# Patient Record
Sex: MALE | Race: WHITE
[De-identification: names, ages, dates, MRNs, and addresses within clinical notes are randomized per-mention and may not be internally consistent; named-entity substitution may affect disease eponyms.]

---

## 2021-10-06 ENCOUNTER — HOSPITAL ENCOUNTER (EMERGENCY)
Dept: HOSPITAL 95 - ER | Age: 66
Discharge: LEFT BEFORE DISCHARGE | End: 2021-10-06
Payer: OTHER GOVERNMENT

## 2021-10-06 VITALS — HEIGHT: 69 IN | WEIGHT: 170 LBS | BODY MASS INDEX: 25.18 KG/M2

## 2021-10-06 DIAGNOSIS — Z53.21: ICD-10-CM

## 2021-10-06 DIAGNOSIS — R07.89: Primary | ICD-10-CM

## 2021-10-06 LAB
ALBUMIN SERPL BCP-MCNC: 3.7 G/DL (ref 3.4–5)
ALBUMIN/GLOB SERPL: 0.8 {RATIO} (ref 0.8–1.8)
ALT SERPL W P-5'-P-CCNC: 54 U/L (ref 12–78)
ANION GAP SERPL CALCULATED.4IONS-SCNC: 7 MMOL/L (ref 6–16)
AST SERPL W P-5'-P-CCNC: 35 U/L (ref 12–37)
BILIRUB SERPL-MCNC: 0.7 MG/DL (ref 0.1–1)
BUN SERPL-MCNC: 10 MG/DL (ref 8–24)
CALCIUM SERPL-MCNC: 9.1 MG/DL (ref 8.5–10.1)
CHLORIDE SERPL-SCNC: 109 MMOL/L (ref 98–108)
CO2 SERPL-SCNC: 26 MMOL/L (ref 21–32)
CREAT SERPL-MCNC: 0.96 MG/DL (ref 0.6–1.2)
GLOBULIN SER CALC-MCNC: 4.9 G/DL (ref 2.2–4)
GLUCOSE SERPL-MCNC: 106 MG/DL (ref 70–99)
POTASSIUM SERPL-SCNC: 3.2 MMOL/L (ref 3.5–5.5)
PROT SERPL-MCNC: 8.6 G/DL (ref 6.4–8.2)
SODIUM SERPL-SCNC: 142 MMOL/L (ref 136–145)
TROPONIN I SERPL-MCNC: <0.015 NG/ML (ref 0–0.04)

## 2021-10-15 ENCOUNTER — HOSPITAL ENCOUNTER (EMERGENCY)
Dept: HOSPITAL 95 - ER | Age: 66
Discharge: HOME | End: 2021-10-15
Payer: OTHER GOVERNMENT

## 2021-10-15 VITALS — BODY MASS INDEX: 27.32 KG/M2 | HEIGHT: 66 IN | WEIGHT: 170 LBS

## 2021-10-15 DIAGNOSIS — R10.9: Primary | ICD-10-CM

## 2021-10-15 DIAGNOSIS — I10: ICD-10-CM

## 2021-10-15 DIAGNOSIS — Z87.891: ICD-10-CM

## 2021-10-15 DIAGNOSIS — Z79.899: ICD-10-CM

## 2021-10-15 LAB
ALBUMIN SERPL BCP-MCNC: 3.9 G/DL (ref 3.4–5)
ALBUMIN/GLOB SERPL: 0.9 {RATIO} (ref 0.8–1.8)
ALT SERPL W P-5'-P-CCNC: 44 U/L (ref 12–78)
ANION GAP SERPL CALCULATED.4IONS-SCNC: 8 MMOL/L (ref 6–16)
AST SERPL W P-5'-P-CCNC: 30 U/L (ref 12–37)
BASOPHILS # BLD AUTO: 0.1 K/MM3 (ref 0–0.23)
BASOPHILS NFR BLD AUTO: 1 % (ref 0–2)
BILIRUB SERPL-MCNC: 1 MG/DL (ref 0.1–1)
BUN SERPL-MCNC: 15 MG/DL (ref 8–24)
CALCIUM SERPL-MCNC: 9.5 MG/DL (ref 8.5–10.1)
CHLORIDE SERPL-SCNC: 105 MMOL/L (ref 98–108)
CO2 SERPL-SCNC: 26 MMOL/L (ref 21–32)
CREAT SERPL-MCNC: 1.06 MG/DL (ref 0.6–1.2)
DEPRECATED RDW RBC AUTO: 48.8 FL (ref 35.1–46.3)
EOSINOPHIL # BLD AUTO: 0.24 K/MM3 (ref 0–0.68)
EOSINOPHIL NFR BLD AUTO: 2 % (ref 0–6)
ERYTHROCYTE [DISTWIDTH] IN BLOOD BY AUTOMATED COUNT: 14.2 % (ref 11.7–14.2)
GLOBULIN SER CALC-MCNC: 4.4 G/DL (ref 2.2–4)
GLUCOSE SERPL-MCNC: 106 MG/DL (ref 70–99)
HCT VFR BLD AUTO: 40.7 % (ref 37–53)
HGB BLD-MCNC: 13.8 G/DL (ref 13.5–17.5)
IMM GRANULOCYTES # BLD AUTO: 0.04 K/MM3 (ref 0–0.1)
IMM GRANULOCYTES NFR BLD AUTO: 0 % (ref 0–1)
KETONES UR STRIP-MCNC: (no result) MG/DL
LYMPHOCYTES # BLD AUTO: 2.58 K/MM3 (ref 0.84–5.2)
LYMPHOCYTES NFR BLD AUTO: 20 % (ref 21–46)
MCHC RBC AUTO-ENTMCNC: 33.9 G/DL (ref 31.5–36.5)
MCV RBC AUTO: 93 FL (ref 80–100)
MONOCYTES # BLD AUTO: 1.25 K/MM3 (ref 0.16–1.47)
MONOCYTES NFR BLD AUTO: 10 % (ref 4–13)
NEUTROPHILS # BLD AUTO: 8.82 K/MM3 (ref 1.96–9.15)
NEUTROPHILS NFR BLD AUTO: 68 % (ref 41–73)
NRBC # BLD AUTO: 0 K/MM3 (ref 0–0.02)
NRBC BLD AUTO-RTO: 0 /100 WBC (ref 0–0.2)
PLATELET # BLD AUTO: 259 K/MM3 (ref 150–400)
POTASSIUM SERPL-SCNC: 3.4 MMOL/L (ref 3.5–5.5)
PROT SERPL-MCNC: 8.3 G/DL (ref 6.4–8.2)
PROT UR STRIP-MCNC: (no result) MG/DL
RBC #/AREA URNS HPF: (no result) /HPF (ref 0–2)
SODIUM SERPL-SCNC: 139 MMOL/L (ref 136–145)
SP GR SPEC: 1.02 (ref 1–1.02)
UROBILINOGEN UR STRIP-MCNC: (no result) MG/DL
WBC #/AREA URNS HPF: (no result) /HPF (ref 0–5)

## 2021-10-17 ENCOUNTER — HOSPITAL ENCOUNTER (INPATIENT)
Dept: HOSPITAL 95 - ER | Age: 66
LOS: 5 days | Discharge: PSYCHIATRIC OR SUBSTANCE USE FACILITY | DRG: 918 | End: 2021-10-22
Attending: HOSPITALIST | Admitting: HOSPITALIST
Payer: OTHER GOVERNMENT

## 2021-10-17 VITALS — WEIGHT: 159.99 LBS | HEIGHT: 69 IN | BODY MASS INDEX: 23.7 KG/M2

## 2021-10-17 DIAGNOSIS — R51.9: ICD-10-CM

## 2021-10-17 DIAGNOSIS — F31.9: ICD-10-CM

## 2021-10-17 DIAGNOSIS — E87.6: ICD-10-CM

## 2021-10-17 DIAGNOSIS — Z87.891: ICD-10-CM

## 2021-10-17 DIAGNOSIS — I95.9: ICD-10-CM

## 2021-10-17 DIAGNOSIS — F19.10: ICD-10-CM

## 2021-10-17 DIAGNOSIS — I10: ICD-10-CM

## 2021-10-17 DIAGNOSIS — T50.912A: Primary | ICD-10-CM

## 2021-10-17 DIAGNOSIS — K59.00: ICD-10-CM

## 2021-10-17 DIAGNOSIS — E83.39: ICD-10-CM

## 2021-10-17 DIAGNOSIS — Z98.890: ICD-10-CM

## 2021-10-17 DIAGNOSIS — F15.10: ICD-10-CM

## 2021-10-17 DIAGNOSIS — Z79.899: ICD-10-CM

## 2021-10-17 DIAGNOSIS — F12.10: ICD-10-CM

## 2021-10-17 DIAGNOSIS — Z90.49: ICD-10-CM

## 2021-10-17 DIAGNOSIS — F41.9: ICD-10-CM

## 2021-10-17 DIAGNOSIS — Z20.822: ICD-10-CM

## 2021-10-17 DIAGNOSIS — F10.10: ICD-10-CM

## 2021-10-17 DIAGNOSIS — D64.9: ICD-10-CM

## 2021-10-17 LAB
ALBUMIN SERPL BCP-MCNC: 3.5 G/DL (ref 3.4–5)
ALBUMIN/GLOB SERPL: 0.8 {RATIO} (ref 0.8–1.8)
ALT SERPL W P-5'-P-CCNC: 38 U/L (ref 12–78)
ANION GAP SERPL CALCULATED.4IONS-SCNC: 5 MMOL/L (ref 6–16)
APAP SERPL-MCNC: <2 UG/ML (ref 10–30)
AST SERPL W P-5'-P-CCNC: 33 U/L (ref 12–37)
BASOPHILS # BLD AUTO: 0.06 K/MM3 (ref 0–0.23)
BASOPHILS NFR BLD AUTO: 1 % (ref 0–2)
BILIRUB SERPL-MCNC: 0.9 MG/DL (ref 0.1–1)
BUN SERPL-MCNC: 8 MG/DL (ref 8–24)
CALCIUM SERPL-MCNC: 9 MG/DL (ref 8.5–10.1)
CHLORIDE SERPL-SCNC: 108 MMOL/L (ref 98–108)
CO2 SERPL-SCNC: 27 MMOL/L (ref 21–32)
CREAT SERPL-MCNC: 0.96 MG/DL (ref 0.6–1.2)
DEPRECATED RDW RBC AUTO: 49 FL (ref 35.1–46.3)
EOSINOPHIL # BLD AUTO: 0.19 K/MM3 (ref 0–0.68)
EOSINOPHIL NFR BLD AUTO: 2 % (ref 0–6)
ERYTHROCYTE [DISTWIDTH] IN BLOOD BY AUTOMATED COUNT: 14.2 % (ref 11.7–14.2)
GLOBULIN SER CALC-MCNC: 4.2 G/DL (ref 2.2–4)
GLUCOSE SERPL-MCNC: 151 MG/DL (ref 70–99)
HCT VFR BLD AUTO: 40.1 % (ref 37–53)
HGB BLD-MCNC: 13.5 G/DL (ref 13.5–17.5)
IMM GRANULOCYTES # BLD AUTO: 0.02 K/MM3 (ref 0–0.1)
IMM GRANULOCYTES NFR BLD AUTO: 0 % (ref 0–1)
LYMPHOCYTES # BLD AUTO: 1.81 K/MM3 (ref 0.84–5.2)
LYMPHOCYTES NFR BLD AUTO: 17 % (ref 21–46)
MCHC RBC AUTO-ENTMCNC: 33.7 G/DL (ref 31.5–36.5)
MCV RBC AUTO: 94 FL (ref 80–100)
MONOCYTES # BLD AUTO: 0.96 K/MM3 (ref 0.16–1.47)
MONOCYTES NFR BLD AUTO: 9 % (ref 4–13)
NEUTROPHILS # BLD AUTO: 7.63 K/MM3 (ref 1.96–9.15)
NEUTROPHILS NFR BLD AUTO: 71 % (ref 41–73)
NRBC # BLD AUTO: 0 K/MM3 (ref 0–0.02)
NRBC BLD AUTO-RTO: 0 /100 WBC (ref 0–0.2)
PLATELET # BLD AUTO: 257 K/MM3 (ref 150–400)
POTASSIUM SERPL-SCNC: 3 MMOL/L (ref 3.5–5.5)
PROT SERPL-MCNC: 7.7 G/DL (ref 6.4–8.2)
SALICYLATES SERPL-MCNC: <1.7 MG/DL (ref 2.8–20)
SARS-COV-2 RNA RESP QL NAA+PROBE: NEGATIVE
SODIUM SERPL-SCNC: 140 MMOL/L (ref 136–145)

## 2021-10-17 PROCEDURE — A9270 NON-COVERED ITEM OR SERVICE: HCPCS

## 2021-10-17 PROCEDURE — U0004 COV-19 TEST NON-CDC HGH THRU: HCPCS

## 2021-10-17 PROCEDURE — G0480 DRUG TEST DEF 1-7 CLASSES: HCPCS

## 2021-10-17 NOTE — NUR
PT C/O HEADACHE. 7 OUT OF 10 ON A 0-10 SCALE. PAIN UNRELIEVED WITH TYLENOL.
DR. LAURENT LEON, NEW ORDER RECIEVED FOR IBUPROFEN.

## 2021-10-17 NOTE — NUR
PT UP TO ICUW 16 VIA STRETCHER FROM ED. ACCOMPANIED BY ED RN. PT TX TO UNIT
BED. BED BATH PERFORMED AT THIS TIME. PT PLACED IN BRIEF D/T INCONTINENCE.
PLACED ON ALL BEDSIDE MONITORS. PT DROWSY, ORIENTED TO PERSON, PLACE,
SITUATION. BED IN LOWEST POSITION. BED ALARM ACTIVE. SEE [employer] FOR DETAILED
ASSESSMENT.

## 2021-10-18 LAB
ALBUMIN SERPL BCP-MCNC: 3 G/DL (ref 3.4–5)
ALBUMIN/GLOB SERPL: 0.8 {RATIO} (ref 0.8–1.8)
ALT SERPL W P-5'-P-CCNC: 31 U/L (ref 12–78)
ANION GAP SERPL CALCULATED.4IONS-SCNC: 6 MMOL/L (ref 6–16)
AST SERPL W P-5'-P-CCNC: 27 U/L (ref 12–37)
BILIRUB SERPL-MCNC: 0.6 MG/DL (ref 0.1–1)
BUN SERPL-MCNC: 8 MG/DL (ref 8–24)
CALCIUM SERPL-MCNC: 8.6 MG/DL (ref 8.5–10.1)
CHLORIDE SERPL-SCNC: 112 MMOL/L (ref 98–108)
CO2 SERPL-SCNC: 25 MMOL/L (ref 21–32)
CREAT SERPL-MCNC: 0.98 MG/DL (ref 0.6–1.2)
DEPRECATED RDW RBC AUTO: 49.7 FL (ref 35.1–46.3)
ERYTHROCYTE [DISTWIDTH] IN BLOOD BY AUTOMATED COUNT: 14.4 % (ref 11.7–14.2)
GLOBULIN SER CALC-MCNC: 3.8 G/DL (ref 2.2–4)
GLUCOSE SERPL-MCNC: 107 MG/DL (ref 70–99)
HCT VFR BLD AUTO: 36.8 % (ref 37–53)
HGB BLD-MCNC: 12.5 G/DL (ref 13.5–17.5)
MCHC RBC AUTO-ENTMCNC: 34 G/DL (ref 31.5–36.5)
MCV RBC AUTO: 94 FL (ref 80–100)
NRBC # BLD AUTO: 0 K/MM3 (ref 0–0.02)
NRBC BLD AUTO-RTO: 0 /100 WBC (ref 0–0.2)
PLATELET # BLD AUTO: 255 K/MM3 (ref 150–400)
POTASSIUM SERPL-SCNC: 3 MMOL/L (ref 3.5–5.5)
PROT SERPL-MCNC: 6.8 G/DL (ref 6.4–8.2)
SODIUM SERPL-SCNC: 143 MMOL/L (ref 136–145)

## 2021-10-18 NOTE — NUR
ASSUMED PT CARE
 
REPORT FROM YAO VILLEGAS AT 0700, ASSUMED PT CARE.
PT ALERT AND ORIENTED X2. CONFUSED TO TIME AND SITUATION. ANSWERS QUESTIONS
APPROPRIATELY, SLOWLY. PT STUTTERS SOME. PT C/O CONSTANTLY ABOUT HIS HEAD
HURTING DESPITE MEDICATIONS AND NONPHARMACOLOGICAL EFFORTS. PT LUNG SOUNDS
DIMINISHED, SATS >92% ON RA. SINUS RHYTHM ON MONITOR. BP STABLE. PT DENIES
N/V. OLD SCARRING TO ABD FROM SOME SURGERY DONE IN [place] PER PT. BOWEL SOUNDS
ACTIVE. VOIDING PER URINAL, CLEAR YELLOW. STARTING ON BOWEL CARE TODAY.
DENIES BEING SUICIDAL.
CALL LIGHT IN REACH. SEE FULL SHIFT ASSESSMENT.

## 2021-10-18 NOTE — NUR
Spiritual care visit attempted. Upon receiving a referral for spiritual care,
I visit patient. Patient is very groggy and asks if I could back another time.
I will continue to remain available to patient and family.

## 2021-10-18 NOTE — NUR
SHIFT SUMMARY
 
PT REMAINS ON 2MD HOLD AND CONTINUOUS VIDEO MONITORING. PT ANXIOUS MOST OF THE
TIME AND CONSTANTLY COMPLAINS ABOUT HEADACHE (WHILE AWAKE). PT ALERT TO PLACE
AND  PERSON. TALKS ABOUT HOW HE DOESNT WANT TO GO BACK TO HIS APARTMENT, PT
CALLS IT correction. PT C/O ABOUT HE CANT TAKE CARE OF HIMSELF AND THAT HE WANTS
TO BE DEAD. STATES HE WONT ATTEMPT WHILE IN HOSPITAL BUT MIGHT IF HE WAS SENT
HOME. NO ACTIVE PLAN. PT HAS GENERALIZED WEAKNESS. ABLE TO FEED HIMSELF, USE
URINAL AND GET DRINKS FROM CUPS. USES CALL LIGHT SOMETIMES. TYPICALLY YELLS
FOR "NURSE". PT ANSWERS QUESTIONS AND ABLE TO COMMUNICATE MOST NEEDS. UP IN
CHAIR FOR MOST OF SHIFT. SEEMS MORE COMFORTABLE. LUNG SOUNDS CLEAR/DIMINISHED.
SATS >92% ON RA. SBP WNL. HR SR ON MONITOR.
WILL REPORT TO ONCOMING SHIFT.

## 2021-10-19 LAB
ALBUMIN SERPL BCP-MCNC: 3 G/DL (ref 3.4–5)
ANION GAP SERPL CALCULATED.4IONS-SCNC: 6 MMOL/L (ref 6–16)
BASOPHILS # BLD AUTO: 0.07 K/MM3 (ref 0–0.23)
BASOPHILS NFR BLD AUTO: 1 % (ref 0–2)
BUN SERPL-MCNC: 8 MG/DL (ref 8–24)
CALCIUM SERPL-MCNC: 8.7 MG/DL (ref 8.5–10.1)
CHLORIDE SERPL-SCNC: 112 MMOL/L (ref 98–108)
CO2 SERPL-SCNC: 22 MMOL/L (ref 21–32)
CREAT SERPL-MCNC: 0.97 MG/DL (ref 0.6–1.2)
DEPRECATED RDW RBC AUTO: 51.6 FL (ref 35.1–46.3)
EOSINOPHIL # BLD AUTO: 0.37 K/MM3 (ref 0–0.68)
EOSINOPHIL NFR BLD AUTO: 4 % (ref 0–6)
ERYTHROCYTE [DISTWIDTH] IN BLOOD BY AUTOMATED COUNT: 14.8 % (ref 11.7–14.2)
GLUCOSE SERPL-MCNC: 88 MG/DL (ref 70–99)
HCT VFR BLD AUTO: 39 % (ref 37–53)
HGB BLD-MCNC: 13.1 G/DL (ref 13.5–17.5)
IMM GRANULOCYTES # BLD AUTO: 0.03 K/MM3 (ref 0–0.1)
IMM GRANULOCYTES NFR BLD AUTO: 0 % (ref 0–1)
LYMPHOCYTES # BLD AUTO: 2.87 K/MM3 (ref 0.84–5.2)
LYMPHOCYTES NFR BLD AUTO: 27 % (ref 21–46)
MCHC RBC AUTO-ENTMCNC: 33.6 G/DL (ref 31.5–36.5)
MCV RBC AUTO: 95 FL (ref 80–100)
MONOCYTES # BLD AUTO: 0.93 K/MM3 (ref 0.16–1.47)
MONOCYTES NFR BLD AUTO: 9 % (ref 4–13)
NEUTROPHILS # BLD AUTO: 6.28 K/MM3 (ref 1.96–9.15)
NEUTROPHILS NFR BLD AUTO: 60 % (ref 41–73)
NRBC # BLD AUTO: 0 K/MM3 (ref 0–0.02)
NRBC BLD AUTO-RTO: 0 /100 WBC (ref 0–0.2)
PHOSPHATE SERPL-MCNC: 2.3 MG/DL (ref 2.5–4.9)
PLATELET # BLD AUTO: 238 K/MM3 (ref 150–400)
POTASSIUM SERPL-SCNC: 3.2 MMOL/L (ref 3.5–5.5)
SODIUM SERPL-SCNC: 140 MMOL/L (ref 136–145)

## 2021-10-19 NOTE — NUR
PT STATES HE IS A BALL OF NERVES. STATES HE WANTS TO DIE BUT HAS NO PLAN AS TO
HOW TO DO THIS. ATTEMPTS TO SOOTHE AND PROVIDE COMFORT BUT PT'S THINKING IS
VERY CONCRETE. CONTINUES TO STATE HE IS ANXIOUS. WILL PROVIDE WITH SCHEDULED
MEDS WHEN AVAILABLE.

## 2021-10-19 NOTE — NUR
ASSUMED CARE:
 
PT RESTING QUIETLY AT THIS TIME. LOW SI RISK PER ORDERS. SINUS TACH ON TELE AT
101. NO ACUTE NEEDS OR CONCERNS AT THIS TIME.

## 2021-10-19 NOTE — NUR
PT CALLS FOR STAFF IN ROOM AND STATES HE IS A BUNDLE OF NERVES. STATES HE
THINKS HE'S HAVING A PANIC ATTACK. ASKED HIM IF HE WAS HAVING THOUGHTS OF
HARMINIG HIMSELF AND STATES HE WISHES HE COULD BUT HAS NO WAY TO DO IT AND
DOESN'T HAVE A PLAN TO DO IT. PHYSICAL THERAPIST ON WAY TO ROOM AT THIS TIME

## 2021-10-19 NOTE — NUR
SHIFT SUMMARY:
 
PT HAS REMAINED LOW S.I. RISK THIS SHIFT. CONTINUES TO SAY HE WANTS TO DIE BUT
DOES NOT HAVE A PLAN IN PLACE. DR CHANG CAME TO SEE PT AND PLAN IS FOR DC
TO FACILITY PER DC PLANNING. PT IS ANXIOUS AT TIMES BUT NO ACUTE CHANGES THIS
SHIFT

## 2021-10-19 NOTE — NUR
A  FROM [facility] (PAMELA) AND A WORKER FROM [facility] (CARMEN), CALLED TO
GET UPDATES ON PT. NUMBERS RELAYED TO NELLY MENDOZA CASE MANAGER, IN ORDER TO
COORDINATE CARE AND ANSWER QUESTIONS

## 2021-10-19 NOTE — NUR
Request to complete Suicide Safety Plan is deferred at this time.  Order for
transfer to higher level of care for psychiatric inpatient hospitalization
in medical record from psychiatrist.
Ju Juarez M.Ed., [facility]

## 2021-10-20 NOTE — NUR
SHIFT SUMMARY
PT ADMITTED ON 10/17 FOR SI. TOOK A HANDFUL OF PILLS. A&OX4 WITH INTERMITTENT
CONFUSION. PT IS ON RA. PT IS ON 2 MD HOLD. PT IS A 1 PERSON ASSIST. ADLS
PROVIDED, SAFETY MEASURES IN PLACE. WILL CONTINUE TO MONITOR.

## 2021-10-21 LAB — SARS-COV-2 RNA RESP QL NAA+PROBE: NEGATIVE

## 2021-10-21 NOTE — NUR
pt has been quite pleasant today. no c/o pain. we were planning d/c [facility] to
[facility] today between noon and 3. this changed to tomorrow. expecting to
transport out at 8am tomorrow. nite rn to call report and set up
transportation shortly after [facility]. no new concerns today.. bed in
low position, call lite in reach, bed alarm on for safety

## 2021-10-21 NOTE — NUR
SHIFT SUMMARY
PT IS A&OX3. ABLE TO MAKE NEEDS KNOWN. FREQUENTLY YELLS OUT FOR NURSE. NO
ACUTE CHANGES ON THIS SHIFT. PT SLEPT MOST OF THE NIGHT. ADLS PROVIDED, SAFETY
MEASURES IN PLACE. SI PRECAUTIONS DISCONTINUED. WILL CONTINUE TO MONITOR.

## 2021-10-22 LAB — CANNABINOIDS UR QL: DETECTED

## 2021-10-22 NOTE — NUR
REPORT CALLED TO ADOLFO VILLEGAS IN [place] IN [facility]. SHE REQUESTED WE REMOVE
IV. DONE. TRANSPORT HERE AT 0830. TOOK PT. PT HAS PERSONAL PRESCRIPTIONS IN
BAG. ITEMIZED AND VERIFIED BY SELF AN ASHLI VILLEGAS. SEALED IN BAG. SIGNED RECEIPT
BY TRANSPORT

## 2021-10-22 NOTE — NUR
PT AWAKE ORIENTED X3 PLEASANT. H/R REG, NO MURMER NOTED. PER TELE NSR AT 80'S.
LUNGS CLEAR, RESP EASY, UNLABORED ON R.A. BT X4 LAST BM 2 DAYS . VOIDS URINAL
AND OCC INCONT. IN ATTENDS. 1 ASST BSC. BED IN LOW POSITION, CALL LITE IN
REACH, BED ALARM ON FOR SAFETY. PT NOT VERBALIZING DESIRE TO HARM SELF TO ME
AT THIS TIME.
PENDING TRANSPORT TO  [facility]. TELE REMMOVED. LEAVING IV
INTACT.

## 2021-10-22 NOTE — NUR
TRANSPORTATION SET UP WITH [facility] SECURE TRANSPORT FOR TRANSFER TO
[facility] AT 0800.

## 2021-10-22 NOTE — NUR
NIGHT SHIFT SUMMARY
 PT A/O X4. SLEPT WELL TONIGHT, USES CALL LIGHT APPROPRIATELY. ROOM AIR,
VITALS STABLE. PLAN TO TRANSFER TO [facility] TODAY. CIWAS
ARE STABLE. NO ACUTE CHANGES, BED ALRM ON, WILL CONTINUE TO MONITOR.